# Patient Record
Sex: FEMALE | Race: WHITE
[De-identification: names, ages, dates, MRNs, and addresses within clinical notes are randomized per-mention and may not be internally consistent; named-entity substitution may affect disease eponyms.]

---

## 2020-02-14 ENCOUNTER — HOSPITAL ENCOUNTER (EMERGENCY)
Dept: HOSPITAL 62 - ER | Age: 25
LOS: 1 days | Discharge: HOME | End: 2020-02-15
Payer: OTHER GOVERNMENT

## 2020-02-14 DIAGNOSIS — R20.2: ICD-10-CM

## 2020-02-14 DIAGNOSIS — R20.0: Primary | ICD-10-CM

## 2020-02-14 DIAGNOSIS — R06.4: ICD-10-CM

## 2020-02-14 DIAGNOSIS — R11.0: ICD-10-CM

## 2020-02-14 DIAGNOSIS — R53.1: ICD-10-CM

## 2020-02-14 PROCEDURE — 80053 COMPREHEN METABOLIC PANEL: CPT

## 2020-02-14 PROCEDURE — 81001 URINALYSIS AUTO W/SCOPE: CPT

## 2020-02-14 PROCEDURE — 99285 EMERGENCY DEPT VISIT HI MDM: CPT

## 2020-02-14 PROCEDURE — 93010 ELECTROCARDIOGRAM REPORT: CPT

## 2020-02-14 PROCEDURE — 71046 X-RAY EXAM CHEST 2 VIEWS: CPT

## 2020-02-14 PROCEDURE — 93005 ELECTROCARDIOGRAM TRACING: CPT

## 2020-02-14 PROCEDURE — 85025 COMPLETE CBC W/AUTO DIFF WBC: CPT

## 2020-02-14 PROCEDURE — 96360 HYDRATION IV INFUSION INIT: CPT

## 2020-02-14 PROCEDURE — 36415 COLL VENOUS BLD VENIPUNCTURE: CPT

## 2020-02-14 PROCEDURE — 81025 URINE PREGNANCY TEST: CPT

## 2020-02-14 NOTE — ER DOCUMENT REPORT
ED Medical Screen (RME)





- General


Chief Complaint: General Weakness


Stated Complaint: WEAKNESS


Notes: 





Patient is a 24-year-old white female with a past medical history of depression 

on citalopram who presents to the emergency department the chief complaint of 

feeling abnormal after taking a Mucinex.  She states that her boyfriend was 

previously sick with a upper respiratory type infection.  She states that she 

began feeling some mild onset of sickness symptoms today that were similar so 

she decided to try to get ahead of it by taking Mucinex.  She states shortly 

after taking the Mucinex she started feeling weird.  She states that she was 

feeling jittery, like her heart was racing and just generally unwell.  She 

states that she read the back of the label for Mucinex which advised that she 

should not take it with antidepressant medications, this concerned her so she 

came to the emergency department for evaluation.  She denies any chest pain or 

shortness of breath.  No abdominal pain.  No visual disturbances.





I have treated and performed a rapid initial assessment of this patient.  A 

comprehensive ED assessment and evaluation of the patient, analysis of test 

results and completion of medical decision making process will be conducted by 

additional ED providers.





PHYSICAL EXAMINATION:





GENERAL: Well-appearing, well-nourished and in no acute distress.  A&Ox4.  

Answers questions appropriately.





Physical Exam





- Vital signs


Vitals: 





                                        











Temp Pulse Resp BP Pulse Ox


 


 99.1 F   120 H  20   142/76 H  100 


 


 02/14/20 23:26  02/14/20 23:26  02/14/20 23:26  02/14/20 23:26  02/14/20 23:26














Course





- Vital Signs


Vital signs: 





                                        











Temp Pulse Resp BP Pulse Ox


 


 99.1 F   120 H  20   142/76 H  100 


 


 02/14/20 23:26  02/14/20 23:26  02/14/20 23:26  02/14/20 23:26  02/14/20 23:26

## 2020-02-15 VITALS — SYSTOLIC BLOOD PRESSURE: 121 MMHG | DIASTOLIC BLOOD PRESSURE: 60 MMHG

## 2020-02-15 LAB
ADD MANUAL DIFF: NO
ALBUMIN SERPL-MCNC: 4.1 G/DL (ref 3.5–5)
ALP SERPL-CCNC: 36 U/L (ref 38–126)
ANION GAP SERPL CALC-SCNC: 10 MMOL/L (ref 5–19)
APPEARANCE UR: (no result)
APTT PPP: YELLOW S
AST SERPL-CCNC: 19 U/L (ref 14–36)
BASOPHILS # BLD AUTO: 0 10^3/UL (ref 0–0.2)
BASOPHILS NFR BLD AUTO: 0.5 % (ref 0–2)
BILIRUB DIRECT SERPL-MCNC: 0.2 MG/DL (ref 0–0.4)
BILIRUB SERPL-MCNC: 1.2 MG/DL (ref 0.2–1.3)
BILIRUB UR QL STRIP: NEGATIVE
BUN SERPL-MCNC: 12 MG/DL (ref 7–20)
CALCIUM: 9.2 MG/DL (ref 8.4–10.2)
CHLORIDE SERPL-SCNC: 104 MMOL/L (ref 98–107)
CO2 SERPL-SCNC: 23 MMOL/L (ref 22–30)
EOSINOPHIL # BLD AUTO: 0.1 10^3/UL (ref 0–0.6)
EOSINOPHIL NFR BLD AUTO: 1.3 % (ref 0–6)
ERYTHROCYTE [DISTWIDTH] IN BLOOD BY AUTOMATED COUNT: 12.2 % (ref 11.5–14)
GLUCOSE SERPL-MCNC: 88 MG/DL (ref 75–110)
GLUCOSE UR STRIP-MCNC: NEGATIVE MG/DL
HCT VFR BLD CALC: 38.6 % (ref 36–47)
HGB BLD-MCNC: 13.4 G/DL (ref 12–15.5)
KETONES UR STRIP-MCNC: NEGATIVE MG/DL
LYMPHOCYTES # BLD AUTO: 2.1 10^3/UL (ref 0.5–4.7)
LYMPHOCYTES NFR BLD AUTO: 34.7 % (ref 13–45)
MCH RBC QN AUTO: 29.9 PG (ref 27–33.4)
MCHC RBC AUTO-ENTMCNC: 34.8 G/DL (ref 32–36)
MCV RBC AUTO: 86 FL (ref 80–97)
MONOCYTES # BLD AUTO: 0.5 10^3/UL (ref 0.1–1.4)
MONOCYTES NFR BLD AUTO: 8.4 % (ref 3–13)
NEUTROPHILS # BLD AUTO: 3.3 10^3/UL (ref 1.7–8.2)
NEUTS SEG NFR BLD AUTO: 55.1 % (ref 42–78)
NITRITE UR QL STRIP: NEGATIVE
PH UR STRIP: 5 [PH] (ref 5–9)
PLATELET # BLD: 253 10^3/UL (ref 150–450)
POTASSIUM SERPL-SCNC: 3.8 MMOL/L (ref 3.6–5)
PROT SERPL-MCNC: 6.9 G/DL (ref 6.3–8.2)
PROT UR STRIP-MCNC: NEGATIVE MG/DL
RBC # BLD AUTO: 4.48 10^6/UL (ref 3.72–5.28)
SP GR UR STRIP: 1.01
TOTAL CELLS COUNTED % (AUTO): 100 %
UROBILINOGEN UR-MCNC: NEGATIVE MG/DL (ref ?–2)
WBC # BLD AUTO: 6 10^3/UL (ref 4–10.5)

## 2020-02-15 NOTE — RADIOLOGY REPORT (SQ)
EXAM DESCRIPTION:

X-RAY CHEST- two views



CLINICAL HISTORY:

Abnormal cardiac symptoms



COMPARISON:

None available



TECHNIQUE:

Two views of the chest.



FINDINGS:

There are no discrete air space infiltrates, pneumothoraces or

pleural effusions. 



The pulmonary vascularity is normal.



The cardiomediastinal silhouette is normal in size.



No suspicious lytic or blastic osseous lesions are identified.



IMPRESSION:

There are no acute lung parenchymal findings.

## 2020-02-15 NOTE — ER DOCUMENT REPORT
ED General





- General


Chief Complaint: Weakness


Stated Complaint: WEAKNESS


Time Seen by Provider: 02/15/20 02:50


Notes: 





CHIEF COMPLAINT: Dizziness tonight





HPI: 24-year-old female who is on an SSRI for antidepression therapy presenting 

with flushing of the skin, tingling in the extremities, lightheadedness and 

slight nausea after taking a cold medication with dextromethorphan this evening.

 Patient states symptoms currently have resolved she has no chest pain shortness

of breath or headache.  Patient states at the time symptoms began she did get up

from the couch after sitting for a longer period of time.





ROS: See HPI - all other systems were reviewed and are otherwise negative


Constitutional: no fever 


Eyes: no drainage, no blurred vision


ENT: no runny nose, no sore throat


Cardiovascular:  no chest pain 


Resp: no SOB, no cough


GI: no vomiting, no diarrhea, no abdominal pain


: no dysuria


Integumentary: no rash 


Allergy: no hives 


Musculoskeletal: no extremity pain or swelling 


Neurological: + numbness/tingling, no weakness





MEDICATIONS: I agree with the patient medications as charted by the RN.





ALLERGIES: I agree with the allergies as charted by the RN.





PAST MEDICAL HISTORY/PAST SURGICAL HISTORY: Reviewed and agree as charted by RN.





SOCIAL HISTORY: Reviewed and agree as charted by RN.





FAMILY HISTORY: No significant familial comorbid conditions directly related to 

patient complaint





EXAM:


Reviewed vital signs as charted by RN.


CONSTITUTIONAL: Alert and oriented and responds appropriately to questions. 

Well-appearing; well-nourished, no acute distress at this time


HEAD: Normocephalic; atraumatic


EYES: PERRL; Conjunctivae clear, sclerae non-icteric, not dilated, no ocular 

clonus


ENT: normal nose; no rhinorrhea; moist mucous membranes; pharynx without lesions

noted, no uvula edema or deviation, no tonsillar hypertrophy, phonation normal


NECK: Supple without meningismus; non-tender; no cervical lymphadenopathy, no 

masses


CARD: RRR; no murmurs, no clicks, no rubs, no gallops; symmetric distal pulses


RESP: Normal chest excursion without splinting or tachypnea; breath sounds clear

and equal bilaterally; no wheezes, no rhonchi, no rales, pulse oximetry 98% on 

room air not hypoxic


ABD/GI: Normal bowel sounds; non-distended; soft, non-tender, no rebound, no 

guarding; no palpable organomegaly or masses.


BACK:  The back appears normal and is non-tender to palpation, there is no CVA 

tenderness


EXT: Normal ROM in all joints; non-tender to palpation; no cyanosis, no 

effusions, no edema, no tremor


SKIN: Normal color for age and race; warm; dry; good turgor; no acute lesions 

noted


NEURO: Moves all extremities equally; Motor and sensory function intact 


PSYCH: The patient's mood and manner are appropriate. Grooming and personal 

hygiene are appropriate.





MDM: 24-year-old female presenting for numbness and tingling, hyperventilation, 

nausea, flushing of the skin this evening.  It occurred after she change 

position may have been a vagal response, cannot completely rule out a serotonin 

response as she took a cough medication with dextromethorphan and is on an SSRI.

 Symptoms have completely resolved at this time so low suspicion for acute 

serotonin syndrome.  Made recommendation to the patient that she not continue 

taking any cold medications with dextromethorphan.  Patient should be able to 

take Sudafed without difficulty for congestion symptoms.  While patient follow-

up with PCP


TRAVEL OUTSIDE OF THE [place] IN LAST 30 DAYS: No





- Related Data


Allergies/Adverse Reactions: 


                                        





No Known Allergies Allergy (Unverified 02/15/20 02:27)


   








Home Medications: wellbutrion 300 mg qday





Past Medical History





- Social History


Smoking Status: Never Smoker


Family History: Reviewed & Not Pertinent


Patient has suicidal ideation: No


Patient has homicidal ideation: No


Past Surgical History: Reports: Hx Tonsillectomy





Physical Exam





- Vital signs


Vitals: 


                                        











Temp Pulse Resp BP Pulse Ox


 


 99.1 F   120 H  20   142/76 H  100 


 


 02/14/20 23:26  02/14/20 23:26  02/14/20 23:26  02/14/20 23:26  02/14/20 23:26














Course





- Vital Signs


Vital signs: 


                                        











Temp Pulse Resp BP Pulse Ox


 


 99.1 F   120 H  20   142/76 H  100 


 


 02/14/20 23:26  02/14/20 23:26  02/14/20 23:26  02/14/20 23:26  02/14/20 23:26














- Laboratory


Result Diagrams: 


                                 02/15/20 00:07





                                 02/15/20 00:07


Laboratory results interpreted by me: 


                                        











  02/15/20 02/15/20





  00:07 02:10


 


Sodium  136.5 L 


 


Alkaline Phosphatase  36 L 


 


Ur Leukocyte Esterase   MODERATE H














Discharge





- Discharge


Clinical Impression: 


 Numbness and tingling





Condition: Stable


Disposition: HOME, SELF-CARE


Additional Instructions: 


Avoid medications with dextromethorphan if you are on an antidepressant.  Should

be able to take Sudafed for congestion.  Speak with your pharmacist about 

medications that may be appropriate if you have cold symptoms.  Follow-up with 

your primary care provider for reevaluation of symptoms call for appointment